# Patient Record
Sex: MALE | URBAN - METROPOLITAN AREA
[De-identification: names, ages, dates, MRNs, and addresses within clinical notes are randomized per-mention and may not be internally consistent; named-entity substitution may affect disease eponyms.]

---

## 2021-06-23 ENCOUNTER — WEB ENCOUNTER (OUTPATIENT)
Dept: URBAN - METROPOLITAN AREA CLINIC 115 | Facility: CLINIC | Age: 56
End: 2021-06-23

## 2021-06-23 ENCOUNTER — OFFICE VISIT (OUTPATIENT)
Dept: URBAN - METROPOLITAN AREA CLINIC 115 | Facility: CLINIC | Age: 56
End: 2021-06-23
Payer: COMMERCIAL

## 2021-06-23 ENCOUNTER — LAB OUTSIDE AN ENCOUNTER (OUTPATIENT)
Dept: URBAN - METROPOLITAN AREA CLINIC 115 | Facility: CLINIC | Age: 56
End: 2021-06-23

## 2021-06-23 VITALS
DIASTOLIC BLOOD PRESSURE: 86 MMHG | SYSTOLIC BLOOD PRESSURE: 140 MMHG | BODY MASS INDEX: 27.14 KG/M2 | HEIGHT: 63 IN | WEIGHT: 153.2 LBS | TEMPERATURE: 98 F | HEART RATE: 67 BPM

## 2021-06-23 DIAGNOSIS — R10.84 GENERALIZED ABDOMINAL PAIN: ICD-10-CM

## 2021-06-23 DIAGNOSIS — K59.01 SLOW TRANSIT CONSTIPATION: ICD-10-CM

## 2021-06-23 DIAGNOSIS — K92.1 HEMATOCHEZIA: ICD-10-CM

## 2021-06-23 PROCEDURE — 99203 OFFICE O/P NEW LOW 30 MIN: CPT | Performed by: INTERNAL MEDICINE

## 2021-06-23 RX ORDER — LINACLOTIDE 145 UG/1
1 CAPSULE AT LEAST 30 MINUTES BEFORE THE FIRST MEAL OF THE DAY ON AN EMPTY STOMACH CAPSULE, GELATIN COATED ORAL ONCE A DAY
Qty: 30 | OUTPATIENT
Start: 2021-06-23 | End: 2021-07-23

## 2021-06-23 NOTE — HPI-TODAY'S VISIT:
CONSTIPATION, AND RECTAL BLEEDING, FOR 5-6 MONTHS,   COLONOSCOPY 2 MONTHS AGO. DOES HAVE HEMORRHOIDA.  STOOL CONSTIPATION OFF AND ON.  STOMACH PAIN SOMETIMES.   NO WT LOSS  NO N/V, OCC NAUSEA  EATING LESS.   H/L PROBLEM  AMLODIPINE, B12, AND VIT D3.  MIRLAX BID, STILL NOT HELPING.  DOES HAVE GENERALIZED ABDOMINAL PAIN

## 2021-10-11 ENCOUNTER — OFFICE VISIT (OUTPATIENT)
Dept: URBAN - METROPOLITAN AREA CLINIC 115 | Facility: CLINIC | Age: 56
End: 2021-10-11

## 2021-10-13 ENCOUNTER — DASHBOARD ENCOUNTERS (OUTPATIENT)
Age: 56
End: 2021-10-13

## 2021-10-13 ENCOUNTER — OFFICE VISIT (OUTPATIENT)
Dept: URBAN - METROPOLITAN AREA CLINIC 115 | Facility: CLINIC | Age: 56
End: 2021-10-13
Payer: COMMERCIAL

## 2021-10-13 ENCOUNTER — WEB ENCOUNTER (OUTPATIENT)
Dept: URBAN - METROPOLITAN AREA CLINIC 115 | Facility: CLINIC | Age: 56
End: 2021-10-13

## 2021-10-13 VITALS
DIASTOLIC BLOOD PRESSURE: 75 MMHG | TEMPERATURE: 97.7 F | WEIGHT: 162.4 LBS | SYSTOLIC BLOOD PRESSURE: 128 MMHG | HEIGHT: 63 IN | HEART RATE: 78 BPM | BODY MASS INDEX: 28.77 KG/M2

## 2021-10-13 DIAGNOSIS — K92.1 HEMATOCHEZIA: ICD-10-CM

## 2021-10-13 DIAGNOSIS — K59.09 CHANGE IN BOWEL MOVEMENTS INTERMITTENT CONSTIPATION. URGENCY IN THE MORNING.: ICD-10-CM

## 2021-10-13 DIAGNOSIS — R10.84 GENERALIZED ABDOMINAL PAIN: ICD-10-CM

## 2021-10-13 PROBLEM — 35298007: Status: ACTIVE | Noted: 2021-06-23

## 2021-10-13 PROBLEM — 449341000124102: Status: ACTIVE | Noted: 2021-06-23

## 2021-10-13 PROBLEM — 102614006: Status: ACTIVE | Noted: 2021-06-23

## 2021-10-13 PROCEDURE — 99213 OFFICE O/P EST LOW 20 MIN: CPT | Performed by: INTERNAL MEDICINE

## 2021-10-13 NOTE — HPI-TODAY'S VISIT:
BLEEDING. OFF AND ON.   CONSTIPATION, STILL GOING ON.   LINZESS HELPED. BUT STIL HAS BLEEDING AND PAIN.   CT SCAN HAS BILATERAL HERNIA. SCHEDULED FOR SURGERY.